# Patient Record
Sex: MALE | NOT HISPANIC OR LATINO | Employment: FULL TIME | URBAN - METROPOLITAN AREA
[De-identification: names, ages, dates, MRNs, and addresses within clinical notes are randomized per-mention and may not be internally consistent; named-entity substitution may affect disease eponyms.]

---

## 2023-03-17 ENCOUNTER — OFFICE VISIT (OUTPATIENT)
Dept: SURGERY | Facility: CLINIC | Age: 53
End: 2023-03-17
Payer: COMMERCIAL

## 2023-03-17 VITALS
WEIGHT: 275 LBS | SYSTOLIC BLOOD PRESSURE: 132 MMHG | DIASTOLIC BLOOD PRESSURE: 86 MMHG | HEIGHT: 70 IN | OXYGEN SATURATION: 95 % | RESPIRATION RATE: 16 BRPM | HEART RATE: 77 BPM | BODY MASS INDEX: 39.37 KG/M2

## 2023-03-17 DIAGNOSIS — K42.9 UMBILICAL HERNIA WITHOUT OBSTRUCTION AND WITHOUT GANGRENE: Primary | ICD-10-CM

## 2023-03-17 PROCEDURE — 99203 OFFICE O/P NEW LOW 30 MIN: CPT | Performed by: SURGERY

## 2023-03-17 RX ORDER — LISINOPRIL 20 MG/1
1 TABLET ORAL
COMMUNITY
Start: 2023-03-08 | End: 2023-11-02

## 2023-03-17 RX ORDER — TIRZEPATIDE 5 MG/.5ML
5 INJECTION, SOLUTION SUBCUTANEOUS WEEKLY
COMMUNITY
Start: 2023-02-24

## 2023-03-17 NOTE — PROGRESS NOTES
New Patient Office Visit      Assessment:    Cristian Allen is a 53 year old male with a Primary, reducible umbilical hernia.    Plan:    Cristian is enrolled in a weight loss program and has been making significant losses.  He has lost over 50lbs thus far. Encouraged continuation of this weight loss program, not only for his health but also for risk reduction for surgery.   We did discuss s/s of strangulated hernia and need for emergent operation.   We also discussed robotic  umbilical hernia repair in detail.   He will plan to call us when he has reached his target weight loss goal or the hernia becomes more symptomatic to schedule repair.     We have discussed observation, reduction techniques and importance, incarceration and strangulation signs, symptoms and importance as well as need to seek emergency treatment.  With watchful waiting likely 20-30% of patients will go on to have elective surgery and 3% will need emergency surgery for their ventral hernia.    We have discussed hernia repair with mesh in detail, including benefits, alternatives, complications, incision, scar, mesh, infection, anesthesia, bleeding, blood transfusion, DVT, PE, hernia recurrence, lifting and activity limits after surgery.  All questions have been answered to the best of my ability.      HPI:  Cristian Allen is a 53 year old male who presents for evaluation of a lump in the rashad-umbilical region.  He first noticed a lump for several years. He states more recently it has become bothersome for him. No signs/symptoms of strangulation. He also states he has started a weight loss program and has thus far been very successful.     Past Medical History:  No past medical history on file.    Past Surgical History:  No past surgical history on file.     Social History:  Social History     Tobacco Use     Smoking status: Never     Smokeless tobacco: Never   Substance Use Topics     Alcohol use: Not Currently     Drug use: Not Currently  "       Family History:  No family history on file.  No FH of bleeding or clotting disorders, or reactions to anesthesia    ROS:  The 10 point review of systems is negative other than noted in the HPI and/or below.    PE:    Vitals: /86   Pulse 77   Resp 16   Ht 1.778 m (5' 10\")   Wt 124.7 kg (275 lb)   SpO2 95%   BMI 39.46 kg/m    BMI= Body mass index is 39.46 kg/m .    General: Generally appears well.  Psych: Alert and Oriented.  Normal affect  Neurological: non-focal, moves extremities symmetrically, grossly normal strength and sensation  Eyes: Sclera clear  ENT: mucous membranes moist, external ears and nose normal  Respiratory: Breathing comfortably on room air  Cardiovascular:  Regular Rate and Rhythm  GI: Abdomen Non Distended Hernia:  umbilical, 5 cm, reducible, mildly tender  MSK: extremities without edema  Lymphatic/Hematologic/Immune:  No femoral lymphadenopathy.  Integumentary:  No rashes    Data reviewed    32 minutes spent on the date of the encounter doing chart review, history and exam, documentation and further activities as noted above      Jad Baum MD  03/17/23 1:54 PM     Please route or send letter to:  Primary Care Provider (PCP)  "

## 2023-11-02 ENCOUNTER — TELEPHONE (OUTPATIENT)
Dept: SURGERY | Facility: CLINIC | Age: 53
End: 2023-11-02

## 2023-11-02 ENCOUNTER — OFFICE VISIT (OUTPATIENT)
Dept: SURGERY | Facility: CLINIC | Age: 53
End: 2023-11-02
Payer: COMMERCIAL

## 2023-11-02 VITALS
DIASTOLIC BLOOD PRESSURE: 102 MMHG | WEIGHT: 210 LBS | OXYGEN SATURATION: 97 % | RESPIRATION RATE: 17 BRPM | HEIGHT: 70 IN | HEART RATE: 77 BPM | SYSTOLIC BLOOD PRESSURE: 172 MMHG | BODY MASS INDEX: 30.06 KG/M2

## 2023-11-02 DIAGNOSIS — K42.9 UMBILICAL HERNIA WITHOUT OBSTRUCTION AND WITHOUT GANGRENE: Primary | ICD-10-CM

## 2023-11-02 PROCEDURE — 99214 OFFICE O/P EST MOD 30 MIN: CPT | Performed by: SURGERY

## 2023-11-02 NOTE — TELEPHONE ENCOUNTER
Type of surgery: ROBOTIC ASSISTED UMBILICAL HERNIA REPAIR   Location of surgery: [facility]  Date and time of surgery: 12-6-23, 7:50 AM  Surgeon: DR CREWS  Pre-Op Appt Date: PATIENT TO SCHEDULE   Post-Op Appt Date: NA   Packet sent out:  GIVEN TO PATIENT   Pre-cert/Authorization completed:  Not Applicable  Date: 11-2-23      ROBOTIC ASSISTED UMBILICAL HERNIA REPAIR GENERAL PT INST TO HAVE H&P 90 MINS REQ PA ASSIST CJP ALW

## 2023-11-02 NOTE — LETTER
Showering Before Surgery      Your surgeon has asked you to take 2 showers before surgery.    Why is this important?  It is normal for bacteria (germs) to be on your skin. The skin protects us from these germs. When you have surgery, we cut the skin. Sometimes germs get into the cuts and cause infection (illness caused by germs). By following the instructions below and using special soap, you will lower the number of germs on your skin. This decreases your chance of infection.  Special soap  Buy or get 8 ounces of antiseptic surgical soap called 4% CHG. Common name brands of this soap are Hibiclens and Exidine.  You can find it at your local pharmacy, clinic or retail store. If you have trouble, ask your pharmacist to help you find the right substitute.  A note about shaving:  Do not shave within 12 inches of your incision (surgical cut) area for at least 3 days before surgery. Shaving can make small cuts in the skin. This puts you at a higher risk of infection.  Items you will need for each shower:  1 newly washed towel  4 ounces of one of the above soaps  Clean pajamas or clothes to change into    Follow these instructions:  Follow these steps the evening before surgery and the morning of surgery.  Wash your hair and body with your regular shampoo and soap. Make sure you rinse the shampoo and soap from your hair and body.  Using clean hands, apply about 2 ounces of soap gently on your skin from your ear lobes to your toes. Use on your groin area last. Do not use this soap on your face or head. If you get any soap in your eyes, ears or mouth, rinse right away.  Repeat step 2. It is very important to let the soap stay on your skin for at least 1 minute.    Rinse well and dry off using a clean towel.    If you feel any tingling, itching or other irritation, rinse right away. It is normal to feel some coolness on the skin after using the antiseptic soap. Your skin may feel a bit dry after the shower, but do not use  any lotions, creams or moisturizers. Do not use hair spray or other products in your hair.  5.  Dress in freshly washed clothes or pajamas. Use fresh pillowcases and sheets on your bed.    Repeat these steps the morning of surgery.  If you have any questions about showering or an allergy to CHG soap, please call your surgery center.    For informational purposes only. Not to replace the advice of your health care provider. Copyright   2012 [facility]. All rights reserved. Clinically reviewed by [employer]. Doculogy 109319 - REV 12

## 2023-11-02 NOTE — LETTER
2023       Cristian Allen    RE: [id number]  : 1970    Cristian Allen has been scheduled for surgery on 23 at 7:50 AM at [facility] with Dr Jad Baum.  The hospital is located at [address] in [place].    Please check in at the Surgery reception desk at 5:50 AM. This is located in the back of the hospital on the East side, just past the Emergency Room entrance.     DO NOT EAT OR DRINK ANYTHING 8 HOURS BEFORE YOUR ARRIVAL TIME.   You may have sips of clear liquids up until 2 hours before your arrival time. If you have been advised to take your medication, please do this early in the morning with just sips of clear liquid.     Hospital regulations require an updated pre-operative examination to be completed within 30 days of the procedure. This can be done by your primary care provider. Please ask them to fax documentation to [phone]. We also recommend you bring a copy with you.     You should shower before your surgery with Hibiclens or Exidine soap.  This can be found at your local pharmacy or you can pick it up from our office for free.  Please call our office if you have any questions.     You will be required to have an Adult (friend or family member) drive you home after your surgery and arrange for an adult to stay with you until the next morning.     You will receive several calls from our staff 3-7 days prior to your scheduled procedure with further details and to answer any questions you may have.    It is sometimes necessary to adjust the surgery schedule due to emergencies and additions to the schedule.  If your surgery is affected by this, we greatly appreciate your flexibility and understanding in this matter    It is best if you call regarding post-operative questions between the hours of 8:00 am & 3:00 pm Monday-Friday, so you have access to the daytime care team that know you best.  Prescription refills are accepted during regular office  hours only.    Please do not bring any Disability or FMLA papers to the hospital.  They need to be either faxed ([phone]), mailed or hand delivered to our office by you or a family member for completion.  Please allow 14 business days to complete paperwork.        If you have questions or concerns, please contact our office at [phone].

## 2023-11-02 NOTE — LETTER
"November 2, 2023      RE:   Cristian Allen 1970      Dear Colleague,    Thank you for referring your patient, Cristian Allen, to [facility] at [facility]. Please see a copy of my visit note below.    Cristian to follow up with Primary Care provider regarding elevated blood pressure.     New Patient Office Visit      Assessment:    Cristian Allen is a 53 year old male with a Primary, partially reducible umbilical hernia.    Plan:    laparoscopic-assisted  umbilical hernia repair.   We will schedule surgery at the patient's convenience.   Will need preop H&P by his PCP (Mirian Capone NP)      We have discussed observation, reduction techniques and importance, incarceration and strangulation signs, symptoms and importance as well as need to seek emergency treatment.  With watchful waiting likely 20-30% of patients will go on to have elective surgery and 5% will need emergency surgery for their ventral hernia.    We have discussed hernia repair with mesh in detail, including benefits, alternatives, complications, incision, scar, mesh, infection, anesthesia, bleeding, blood transfusion, DVT, PE, hernia recurrence, lifting and activity limits after surgery.  All questions have been answered to the best of my ability.    He has been given literature to review.     Recommended time off work postop:  1 wks  HPI:  Cristian Allen is a 53 year old male who presents for evaluation of a lump in the rashad-umbilical region.  He does have pain and states it feels dull, aching, and pressure. He describes exacerbating factors including bending, prolonged standing, lifting, and flexing.        Family History:  No family history on file.  No FH of bleeding or clotting disorders, or reactions to anesthesia    ROS:  The 10 point review of systems is negative other than noted in the HPI and/or below.    PE:    Vitals: BP (!) 172/102   Pulse 77   Resp 17   Ht 1.778 m (5' 10\")   Wt 95.3 kg (210 lb)  "  SpO2 97%   BMI 30.13 kg/m    BMI= Body mass index is 30.13 kg/m .    General: Generally appears well.  Psych: Alert and Oriented.  Normal affect  Neurological: non-focal, moves extremities symmetrically, grossly normal strength and sensation  Eyes: Sclera clear  ENT: mucous membranes moist, external ears and nose normal  Respiratory:  Lungs clear to ausculation bilaterally with good air excursion Breathing comfortably on room air  Cardiovascular:  Regular Rate and Rhythm with no murmurs gallops or rubs, normal peripheral pulses  GI: Abdomen Distended Hernia:  umbilical, mild tenderness with palpation  MSK: extremities without edema  Lymphatic/Hematologic/Immune:  No femoral lymphadenopathy.  Integumentary:  No rashes    Again, thank you for allowing me to participate in the care of your patient.      Sincerely,      Jad Baum MD

## 2023-11-28 RX ORDER — HYDROCHLOROTHIAZIDE 25 MG/1
25 TABLET ORAL DAILY
COMMUNITY

## 2023-11-28 RX ORDER — LISINOPRIL 40 MG/1
40 TABLET ORAL DAILY
COMMUNITY

## 2023-11-28 RX ORDER — CHLORAL HYDRATE 500 MG
2 CAPSULE ORAL DAILY
COMMUNITY

## 2023-11-28 RX ORDER — MULTIVITAMIN WITH IRON
1 TABLET ORAL DAILY
COMMUNITY

## 2023-12-06 ENCOUNTER — HOSPITAL ENCOUNTER (OUTPATIENT)
Facility: CLINIC | Age: 53
Discharge: HOME | End: 2023-12-06
Attending: SURGERY | Admitting: SURGERY
Payer: COMMERCIAL

## 2023-12-06 ENCOUNTER — ANESTHESIA (OUTPATIENT)
Dept: SURGERY | Facility: CLINIC | Age: 53
End: 2023-12-06
Payer: COMMERCIAL

## 2023-12-06 ENCOUNTER — ANESTHESIA EVENT (OUTPATIENT)
Dept: SURGERY | Facility: CLINIC | Age: 53
End: 2023-12-06
Payer: COMMERCIAL

## 2023-12-06 ENCOUNTER — APPOINTMENT (OUTPATIENT)
Dept: SURGERY | Facility: PHYSICIAN GROUP | Age: 53
End: 2023-12-06
Payer: COMMERCIAL

## 2023-12-06 VITALS
HEART RATE: 62 BPM | RESPIRATION RATE: 16 BRPM | DIASTOLIC BLOOD PRESSURE: 65 MMHG | WEIGHT: 216.1 LBS | SYSTOLIC BLOOD PRESSURE: 101 MMHG | TEMPERATURE: 96.8 F | BODY MASS INDEX: 31.01 KG/M2 | OXYGEN SATURATION: 96 %

## 2023-12-06 DIAGNOSIS — K42.9 UMBILICAL HERNIA WITHOUT OBSTRUCTION AND WITHOUT GANGRENE: Primary | ICD-10-CM

## 2023-12-06 LAB — GLUCOSE BLDC GLUCOMTR-MCNC: 98 MG/DL (ref 70–99)

## 2023-12-06 PROCEDURE — 258N000003 HC RX IP 258 OP 636: Performed by: NURSE ANESTHETIST, CERTIFIED REGISTERED

## 2023-12-06 PROCEDURE — 82962 GLUCOSE BLOOD TEST: CPT

## 2023-12-06 PROCEDURE — 250N000011 HC RX IP 250 OP 636: Mod: JZ | Performed by: ANESTHESIOLOGY

## 2023-12-06 PROCEDURE — 250N000011 HC RX IP 250 OP 636: Performed by: NURSE ANESTHETIST, CERTIFIED REGISTERED

## 2023-12-06 PROCEDURE — C1781 MESH (IMPLANTABLE): HCPCS | Performed by: SURGERY

## 2023-12-06 PROCEDURE — 250N000011 HC RX IP 250 OP 636: Performed by: SURGERY

## 2023-12-06 PROCEDURE — 999N000141 HC STATISTIC PRE-PROCEDURE NURSING ASSESSMENT: Performed by: SURGERY

## 2023-12-06 PROCEDURE — 710N000009 HC RECOVERY PHASE 1, LEVEL 1, PER MIN: Performed by: SURGERY

## 2023-12-06 PROCEDURE — 370N000017 HC ANESTHESIA TECHNICAL FEE, PER MIN: Performed by: SURGERY

## 2023-12-06 PROCEDURE — 250N000013 HC RX MED GY IP 250 OP 250 PS 637: Performed by: SURGERY

## 2023-12-06 PROCEDURE — 250N000025 HC SEVOFLURANE, PER MIN: Performed by: SURGERY

## 2023-12-06 PROCEDURE — 49594 RPR AA HRN 1ST 3-10 NCR/STRN: CPT | Performed by: SURGERY

## 2023-12-06 PROCEDURE — 250N000009 HC RX 250: Performed by: NURSE ANESTHETIST, CERTIFIED REGISTERED

## 2023-12-06 PROCEDURE — 272N000001 HC OR GENERAL SUPPLY STERILE: Performed by: SURGERY

## 2023-12-06 PROCEDURE — 710N000012 HC RECOVERY PHASE 2, PER MINUTE: Performed by: SURGERY

## 2023-12-06 PROCEDURE — 360N000080 HC SURGERY LEVEL 7, PER MIN: Performed by: SURGERY

## 2023-12-06 DEVICE — MESH SURGICAL INGUINAL HRN REP MACROPOR 15CMX15CM  PPM1515X3: Type: IMPLANTABLE DEVICE | Site: ABDOMEN | Status: FUNCTIONAL

## 2023-12-06 RX ORDER — ONDANSETRON 4 MG/1
4 TABLET, FILM COATED ORAL EVERY 8 HOURS PRN
Qty: 12 TABLET | Refills: 0 | Status: SHIPPED | OUTPATIENT
Start: 2023-12-06

## 2023-12-06 RX ORDER — CEFAZOLIN SODIUM/WATER 2 G/20 ML
2 SYRINGE (ML) INTRAVENOUS
Status: DISCONTINUED | OUTPATIENT
Start: 2023-12-06 | End: 2023-12-06 | Stop reason: HOSPADM

## 2023-12-06 RX ORDER — DEXAMETHASONE SODIUM PHOSPHATE 4 MG/ML
4 INJECTION, SOLUTION INTRA-ARTICULAR; INTRALESIONAL; INTRAMUSCULAR; INTRAVENOUS; SOFT TISSUE
Status: DISCONTINUED | OUTPATIENT
Start: 2023-12-06 | End: 2023-12-06 | Stop reason: HOSPADM

## 2023-12-06 RX ORDER — FENTANYL CITRATE 50 UG/ML
50 INJECTION, SOLUTION INTRAMUSCULAR; INTRAVENOUS EVERY 5 MIN PRN
Status: DISCONTINUED | OUTPATIENT
Start: 2023-12-06 | End: 2023-12-06 | Stop reason: HOSPADM

## 2023-12-06 RX ORDER — DIAZEPAM 10 MG/2ML
2.5 INJECTION, SOLUTION INTRAMUSCULAR; INTRAVENOUS
Status: DISCONTINUED | OUTPATIENT
Start: 2023-12-06 | End: 2023-12-06 | Stop reason: HOSPADM

## 2023-12-06 RX ORDER — ONDANSETRON 4 MG/1
4 TABLET, ORALLY DISINTEGRATING ORAL EVERY 30 MIN PRN
Status: DISCONTINUED | OUTPATIENT
Start: 2023-12-06 | End: 2023-12-06 | Stop reason: HOSPADM

## 2023-12-06 RX ORDER — LIDOCAINE 40 MG/G
CREAM TOPICAL
Status: DISCONTINUED | OUTPATIENT
Start: 2023-12-06 | End: 2023-12-06 | Stop reason: HOSPADM

## 2023-12-06 RX ORDER — HYDROMORPHONE HCL IN WATER/PF 6 MG/30 ML
0.4 PATIENT CONTROLLED ANALGESIA SYRINGE INTRAVENOUS EVERY 5 MIN PRN
Status: DISCONTINUED | OUTPATIENT
Start: 2023-12-06 | End: 2023-12-06 | Stop reason: HOSPADM

## 2023-12-06 RX ORDER — BUPIVACAINE HYDROCHLORIDE 5 MG/ML
INJECTION, SOLUTION PERINEURAL PRN
Status: DISCONTINUED | OUTPATIENT
Start: 2023-12-06 | End: 2023-12-06 | Stop reason: HOSPADM

## 2023-12-06 RX ORDER — FENTANYL CITRATE 50 UG/ML
25 INJECTION, SOLUTION INTRAMUSCULAR; INTRAVENOUS EVERY 5 MIN PRN
Status: DISCONTINUED | OUTPATIENT
Start: 2023-12-06 | End: 2023-12-06 | Stop reason: HOSPADM

## 2023-12-06 RX ORDER — EPHEDRINE SULFATE 50 MG/ML
INJECTION, SOLUTION INTRAMUSCULAR; INTRAVENOUS; SUBCUTANEOUS PRN
Status: DISCONTINUED | OUTPATIENT
Start: 2023-12-06 | End: 2023-12-06

## 2023-12-06 RX ORDER — OXYCODONE HYDROCHLORIDE 5 MG/1
5 TABLET ORAL EVERY 4 HOURS PRN
Status: DISCONTINUED | OUTPATIENT
Start: 2023-12-06 | End: 2023-12-06 | Stop reason: HOSPADM

## 2023-12-06 RX ORDER — SODIUM CHLORIDE, SODIUM LACTATE, POTASSIUM CHLORIDE, CALCIUM CHLORIDE 600; 310; 30; 20 MG/100ML; MG/100ML; MG/100ML; MG/100ML
INJECTION, SOLUTION INTRAVENOUS CONTINUOUS
Status: DISCONTINUED | OUTPATIENT
Start: 2023-12-06 | End: 2023-12-06 | Stop reason: HOSPADM

## 2023-12-06 RX ORDER — LABETALOL HYDROCHLORIDE 5 MG/ML
10 INJECTION, SOLUTION INTRAVENOUS EVERY 10 MIN PRN
Status: DISCONTINUED | OUTPATIENT
Start: 2023-12-06 | End: 2023-12-06 | Stop reason: HOSPADM

## 2023-12-06 RX ORDER — PROPOFOL 10 MG/ML
INJECTION, EMULSION INTRAVENOUS PRN
Status: DISCONTINUED | OUTPATIENT
Start: 2023-12-06 | End: 2023-12-06

## 2023-12-06 RX ORDER — MEPERIDINE HYDROCHLORIDE 25 MG/ML
12.5 INJECTION INTRAMUSCULAR; INTRAVENOUS; SUBCUTANEOUS EVERY 5 MIN PRN
Status: DISCONTINUED | OUTPATIENT
Start: 2023-12-06 | End: 2023-12-06 | Stop reason: HOSPADM

## 2023-12-06 RX ORDER — DEXAMETHASONE SODIUM PHOSPHATE 4 MG/ML
INJECTION, SOLUTION INTRA-ARTICULAR; INTRALESIONAL; INTRAMUSCULAR; INTRAVENOUS; SOFT TISSUE PRN
Status: DISCONTINUED | OUTPATIENT
Start: 2023-12-06 | End: 2023-12-06

## 2023-12-06 RX ORDER — CEFAZOLIN SODIUM/WATER 2 G/20 ML
2 SYRINGE (ML) INTRAVENOUS SEE ADMIN INSTRUCTIONS
Status: DISCONTINUED | OUTPATIENT
Start: 2023-12-06 | End: 2023-12-06 | Stop reason: HOSPADM

## 2023-12-06 RX ORDER — FENTANYL CITRATE 50 UG/ML
25 INJECTION, SOLUTION INTRAMUSCULAR; INTRAVENOUS
Status: DISCONTINUED | OUTPATIENT
Start: 2023-12-06 | End: 2023-12-06 | Stop reason: HOSPADM

## 2023-12-06 RX ORDER — ONDANSETRON 2 MG/ML
4 INJECTION INTRAMUSCULAR; INTRAVENOUS EVERY 30 MIN PRN
Status: DISCONTINUED | OUTPATIENT
Start: 2023-12-06 | End: 2023-12-06 | Stop reason: HOSPADM

## 2023-12-06 RX ORDER — ONDANSETRON 2 MG/ML
INJECTION INTRAMUSCULAR; INTRAVENOUS PRN
Status: DISCONTINUED | OUTPATIENT
Start: 2023-12-06 | End: 2023-12-06

## 2023-12-06 RX ORDER — AMOXICILLIN 250 MG
1-2 CAPSULE ORAL 2 TIMES DAILY
Qty: 30 TABLET | Refills: 0 | Status: SHIPPED | OUTPATIENT
Start: 2023-12-06

## 2023-12-06 RX ORDER — OXYCODONE HYDROCHLORIDE 5 MG/1
5-10 TABLET ORAL EVERY 4 HOURS PRN
Qty: 6 TABLET | Refills: 0 | Status: SHIPPED | OUTPATIENT
Start: 2023-12-06

## 2023-12-06 RX ORDER — OXYCODONE HYDROCHLORIDE 5 MG/1
10 TABLET ORAL EVERY 4 HOURS PRN
Status: DISCONTINUED | OUTPATIENT
Start: 2023-12-06 | End: 2023-12-06 | Stop reason: HOSPADM

## 2023-12-06 RX ORDER — FENTANYL CITRATE 50 UG/ML
INJECTION, SOLUTION INTRAMUSCULAR; INTRAVENOUS PRN
Status: DISCONTINUED | OUTPATIENT
Start: 2023-12-06 | End: 2023-12-06

## 2023-12-06 RX ORDER — SODIUM CHLORIDE, SODIUM LACTATE, POTASSIUM CHLORIDE, CALCIUM CHLORIDE 600; 310; 30; 20 MG/100ML; MG/100ML; MG/100ML; MG/100ML
INJECTION, SOLUTION INTRAVENOUS CONTINUOUS PRN
Status: DISCONTINUED | OUTPATIENT
Start: 2023-12-06 | End: 2023-12-06

## 2023-12-06 RX ORDER — LIDOCAINE HYDROCHLORIDE 20 MG/ML
INJECTION, SOLUTION INFILTRATION; PERINEURAL PRN
Status: DISCONTINUED | OUTPATIENT
Start: 2023-12-06 | End: 2023-12-06

## 2023-12-06 RX ORDER — HYDROMORPHONE HCL IN WATER/PF 6 MG/30 ML
0.2 PATIENT CONTROLLED ANALGESIA SYRINGE INTRAVENOUS EVERY 5 MIN PRN
Status: DISCONTINUED | OUTPATIENT
Start: 2023-12-06 | End: 2023-12-06 | Stop reason: HOSPADM

## 2023-12-06 RX ORDER — ALBUTEROL SULFATE 0.83 MG/ML
2.5 SOLUTION RESPIRATORY (INHALATION) EVERY 4 HOURS PRN
Status: DISCONTINUED | OUTPATIENT
Start: 2023-12-06 | End: 2023-12-06 | Stop reason: HOSPADM

## 2023-12-06 RX ORDER — OXYCODONE HYDROCHLORIDE 5 MG/1
5 TABLET ORAL
Status: COMPLETED | OUTPATIENT
Start: 2023-12-06 | End: 2023-12-06

## 2023-12-06 RX ADMIN — SUGAMMADEX 150 MG: 100 INJECTION, SOLUTION INTRAVENOUS at 09:43

## 2023-12-06 RX ADMIN — PHENYLEPHRINE HYDROCHLORIDE 200 MCG: 10 INJECTION INTRAVENOUS at 08:48

## 2023-12-06 RX ADMIN — DEXAMETHASONE SODIUM PHOSPHATE 8 MG: 4 INJECTION, SOLUTION INTRA-ARTICULAR; INTRALESIONAL; INTRAMUSCULAR; INTRAVENOUS; SOFT TISSUE at 08:04

## 2023-12-06 RX ADMIN — Medication 2 G: at 08:02

## 2023-12-06 RX ADMIN — FENTANYL CITRATE 50 MCG: 50 INJECTION INTRAMUSCULAR; INTRAVENOUS at 08:07

## 2023-12-06 RX ADMIN — ONDANSETRON 4 MG: 2 INJECTION INTRAMUSCULAR; INTRAVENOUS at 11:52

## 2023-12-06 RX ADMIN — PHENYLEPHRINE HYDROCHLORIDE 200 MCG: 10 INJECTION INTRAVENOUS at 08:56

## 2023-12-06 RX ADMIN — LIDOCAINE HYDROCHLORIDE 50 MG: 20 INJECTION, SOLUTION INFILTRATION; PERINEURAL at 08:03

## 2023-12-06 RX ADMIN — ROCURONIUM BROMIDE 10 MG: 50 INJECTION, SOLUTION INTRAVENOUS at 08:48

## 2023-12-06 RX ADMIN — FENTANYL CITRATE 50 MCG: 50 INJECTION INTRAMUSCULAR; INTRAVENOUS at 08:03

## 2023-12-06 RX ADMIN — OXYCODONE HYDROCHLORIDE 5 MG: 5 TABLET ORAL at 10:56

## 2023-12-06 RX ADMIN — Medication 10 MG: at 09:03

## 2023-12-06 RX ADMIN — ROCURONIUM BROMIDE 50 MG: 50 INJECTION, SOLUTION INTRAVENOUS at 08:04

## 2023-12-06 RX ADMIN — PHENYLEPHRINE HYDROCHLORIDE 200 MCG: 10 INJECTION INTRAVENOUS at 08:30

## 2023-12-06 RX ADMIN — SODIUM CHLORIDE, POTASSIUM CHLORIDE, SODIUM LACTATE AND CALCIUM CHLORIDE: 600; 310; 30; 20 INJECTION, SOLUTION INTRAVENOUS at 07:34

## 2023-12-06 RX ADMIN — FENTANYL CITRATE 50 MCG: 50 INJECTION, SOLUTION INTRAMUSCULAR; INTRAVENOUS at 10:17

## 2023-12-06 RX ADMIN — ONDANSETRON 4 MG: 2 INJECTION INTRAMUSCULAR; INTRAVENOUS at 09:42

## 2023-12-06 RX ADMIN — SODIUM CHLORIDE, POTASSIUM CHLORIDE, SODIUM LACTATE AND CALCIUM CHLORIDE: 600; 310; 30; 20 INJECTION, SOLUTION INTRAVENOUS at 08:43

## 2023-12-06 RX ADMIN — PHENYLEPHRINE HYDROCHLORIDE 200 MCG: 10 INJECTION INTRAVENOUS at 08:14

## 2023-12-06 RX ADMIN — HYDROMORPHONE HYDROCHLORIDE 1 MG: 1 INJECTION, SOLUTION INTRAMUSCULAR; INTRAVENOUS; SUBCUTANEOUS at 08:47

## 2023-12-06 RX ADMIN — PHENYLEPHRINE HYDROCHLORIDE 200 MCG: 10 INJECTION INTRAVENOUS at 08:19

## 2023-12-06 RX ADMIN — PROPOFOL 200 MG: 10 INJECTION, EMULSION INTRAVENOUS at 08:03

## 2023-12-06 RX ADMIN — FENTANYL CITRATE 50 MCG: 50 INJECTION, SOLUTION INTRAMUSCULAR; INTRAVENOUS at 10:07

## 2023-12-06 RX ADMIN — HYDROMORPHONE HYDROCHLORIDE 0.2 MG: 0.2 INJECTION, SOLUTION INTRAMUSCULAR; INTRAVENOUS; SUBCUTANEOUS at 10:23

## 2023-12-06 RX ADMIN — MIDAZOLAM 2 MG: 1 INJECTION INTRAMUSCULAR; INTRAVENOUS at 07:59

## 2023-12-06 ASSESSMENT — ACTIVITIES OF DAILY LIVING (ADL)
ADLS_ACUITY_SCORE: 35

## 2023-12-06 NOTE — ANESTHESIA CARE TRANSFER NOTE
Patient: Cristian Allen    Procedure: Procedure(s):  HERNIORRHAPHY, UMBILICAL, ROBOT-ASSISTED       Diagnosis: Umbilical hernia without obstruction and without gangrene [K42.9]  Diagnosis Additional Information: No value filed.    Anesthesia Type:   General     Note:      Level of Consciousness: awake  Oxygen Supplementation: face mask    Independent Airway: airway patency satisfactory and stable  Dentition: dentition unchanged  Vital Signs Stable: post-procedure vital signs reviewed and stable  Report to RN Given: handoff report given  Patient transferred to: PACU    Handoff Report: Identifed the Patient, Identified the Reponsible Provider, Reviewed the pertinent medical history, Discussed the surgical course, Reviewed Intra-OP anesthesia mangement and issues during anesthesia, Set expectations for post-procedure period and Allowed opportunity for questions and acknowledgement of understanding      Vitals:  Vitals Value Taken Time   /78 12/06/23 0954   Temp     Pulse 68 12/06/23 0955   Resp 17 12/06/23 0955   SpO2 100 % 12/06/23 0955   Vitals shown include unfiled device data.    Electronically Signed By: SIMBA Perez CRNA  December 6, 2023  9:56 AM

## 2023-12-06 NOTE — ANESTHESIA PREPROCEDURE EVALUATION
"Anesthesia Pre-Procedure Evaluation    Patient: Cristian Allen   MRN: [id number] : 1970        Procedure : Procedure(s):  HERNIORRHAPHY, UMBILICAL, ROBOT-ASSISTED          Past Medical History:   Diagnosis Date    History of blood transfusion     after orthopedic surgery    Hypertension     Sleep apnea     uses cpap      Past Surgical History:   Procedure Laterality Date    ORTHOPEDIC SURGERY Left     ORIF femur      No Known Allergies   Social History     Tobacco Use    Smoking status: Never    Smokeless tobacco: Never   Substance Use Topics    Alcohol use: Not Currently      Wt Readings from Last 1 Encounters:   23 98 kg (216 lb 1.6 oz)        Anesthesia Evaluation   Pt has had prior anesthetic. Type: General.    No history of anesthetic complications       ROS/MED HX  ENT/Pulmonary:     (+) sleep apnea, uses CPAP,                                     Neurologic:  - neg neurologic ROS     Cardiovascular:     (+)  hypertension- -   -  - -                                      METS/Exercise Tolerance:     Hematologic:  - neg hematologic  ROS     Musculoskeletal:  - neg musculoskeletal ROS     GI/Hepatic: Comment: Umbilical hernia      Renal/Genitourinary:  - neg Renal ROS     Endo:  - neg endo ROS     Psychiatric/Substance Use:  - neg psychiatric ROS     Infectious Disease:  - neg infectious disease ROS     Malignancy:  - neg malignancy ROS     Other:  - neg other ROS          Physical Exam    Airway        Mallampati: III   TM distance: > 3 FB   Neck ROM: full   Mouth opening: > 3 cm    Respiratory Devices and Support         Dental  no notable dental history   Comment: Chipped #25        Cardiovascular   cardiovascular exam normal          Pulmonary   pulmonary exam normal                OUTSIDE LABS:  CBC: No results found for: \"WBC\", \"HGB\", \"HCT\", \"PLT\"  BMP: No results found for: \"NA\", \"POTASSIUM\", \"CHLORIDE\", \"CO2\", \"BUN\", \"CR\", \"GLC\"  COAGS: No results found for: \"PTT\", \"INR\", \"FIBR\"  POC: No " "results found for: \"BGM\", \"HCG\", \"HCGS\"  HEPATIC: No results found for: \"ALBUMIN\", \"PROTTOTAL\", \"ALT\", \"AST\", \"GGT\", \"ALKPHOS\", \"BILITOTAL\", \"BILIDIRECT\", \"CHRISTIANO\"  OTHER: No results found for: \"PH\", \"LACT\", \"A1C\", \"MARICEL\", \"PHOS\", \"MAG\", \"LIPASE\", \"AMYLASE\", \"TSH\", \"T4\", \"T3\", \"CRP\", \"SED\"    Anesthesia Plan    ASA Status:  3    NPO Status:  NPO Appropriate    Anesthesia Type: General.     - Airway: ETT   Induction: Intravenous, Propofol.   Maintenance: Balanced.        Consents    Anesthesia Plan(s) and associated risks, benefits, and realistic alternatives discussed. Questions answered and patient/representative(s) expressed understanding.     - Discussed:     - Discussed with:  Patient            Postoperative Care    Pain management: IV analgesics, Oral pain medications, Multi-modal analgesia.   PONV prophylaxis: Ondansetron (or other 5HT-3), Dexamethasone or Solumedrol     Comments:               Robert Russo MD    I have reviewed the pertinent notes and labs in the chart from the past 30 days and (re)examined the patient.  Any updates or changes from those notes are reflected in this note.                  "

## 2023-12-06 NOTE — OP NOTE
[facility] General Surgery Operative Note    Pre-operative diagnosis: Incarcerated umbilical hernia   Post-operative diagnosis: same   Procedure: Robotic assisted laparoscopic KEANN Umbilical hernia repair with mesh   Surgeon: Jad Baum MD     Assistant(s): Emilee Jackson PA-C  The Physician Assistant was medically necessary for their expertise in prepping, camera management, exchanging robotic instruments, passing suture and mesh through the robotic ports, and suturing   Anesthesia: general   Estimated blood loss:  Specimen:  FINDINGS:  5 cc  none  3.5cmx2.5cm incarcerated umbilical hernia       Indication for Procedure: This is a 53 year old male who presented to the office with a symptomatic hernia and desired repair. We discussed approaches to management and mutually agreed on a robotic approach for this patient.    Description of procedure:  Patient was brought to the operating room, placed on the operating table in supine position. Anesthesia was induced. His  pressure points were padded and he was secured with a safety strap. A timeout was called to verify the patient, site of procedure and procedure to be performed.    The abdomen was entered with a Veress needle at Nuñez's point. Pneumoperitoneum was established. The Veress was removed and an 8mm robotic trocar was placed into the abdomen. The abdomen was surveyed and found to have no obvious injuries from needle or trocar placement.  Two additional 8mm ports were then placed in the left abdomen, after marking out the dimensions of the hernia. Care was taken to make sure ports were adequately spaced from bony structures to allow for movement.  The robot was then docked. With a monopolar scissors in the right hand and force bipolar in the left hand, the contents of the hernia sac (omentum) were reduced into the abdomen. Bleeding was controlled with cautery. Next a score klaus was made in the peritoneum approximately 6 cm superior to the hernia  defect. The peritoneum was then sharp and bluntly dissected off of the anterior abdominal wall, creating a space large enough to accomodate our mesh. The perotoneum was able to be completely reduced out of the hernia.   There was a single midline hernia defect. A running #1 Strattafix was then used to close the fascial defect(s). The bipolar grasper was used to measure the space and a [id number] parietene mesh was then cut and placed into the abdomen. This laid flat against the abdominal wall. We secured the mesh in 6 quadrants with 2-0 vicryl suture.  Next the peritoneal defect as closed with a running 3-0 stratafix.  The abdominal cavity was inspected and there was adequate hemostasis. The trocars were then removed. The skin was closed with 4-0 suture. Sterile dressings were applied with a pressure dressing placed at the umbilicus to aid against seroma formation. At the end of the operation, all sponge, instrument, and needle counts were correct.     Jad Baum MD

## 2023-12-06 NOTE — PROGRESS NOTES
Gen Surg  12/6/2023    52 yo male here today for umbilical hernia repair via robot  Discussed risks/benefits/alternatives to the procedure.  All questions answered.    Gen: NAD  Lungs: CTAB  Heart: RRR  Abd: soft, non-tender, umbilical hernia present.    A/P:  52 yo male with umbilical hernia  -proceed with laparoscopic/robotic umbilical hernia repair.

## 2023-12-06 NOTE — ANESTHESIA POSTPROCEDURE EVALUATION
Patient: Cristian Allen    Procedure: Procedure(s):  HERNIORRHAPHY, UMBILICAL, ROBOT-ASSISTED       Anesthesia Type:  General    Note:     Postop Pain Control: Uneventful            Sign Out: Well controlled pain   PONV: No   Neuro/Psych: Uneventful            Sign Out: Acceptable/Baseline neuro status   Airway/Respiratory: Uneventful            Sign Out: Acceptable/Baseline resp. status   CV/Hemodynamics: Uneventful            Sign Out: Acceptable CV status   Other NRE: NONE   DID A NON-ROUTINE EVENT OCCUR? No           Last vitals:  Vitals Value Taken Time   /71 12/06/23 1010   Temp 97.6  F (36.4  C) 12/06/23 0954   Pulse 59 12/06/23 1015   Resp 16 12/06/23 1015   SpO2 100 % 12/06/23 1015   Vitals shown include unfiled device data.    Electronically Signed By: Robert Russo MD  December 6, 2023  10:16 AM

## 2023-12-06 NOTE — ANESTHESIA PROCEDURE NOTES
Airway       Patient location during procedure: OR       Procedure Start/Stop Times: 12/6/2023 8:06 AM  Staff -        Performed By: CRNA  Consent for Airway        Urgency: elective  Indications and Patient Condition       Indications for airway management: rashad-procedural and airway protection       Induction type:intravenous       Mask difficulty assessment: 1 - vent by mask    Final Airway Details       Final airway type: endotracheal airway       Successful airway: ETT - single  Endotracheal Airway Details        ETT size (mm): 7.0       Cuffed: yes       Successful intubation technique: direct laryngoscopy       DL Blade Type: [place] 2       Grade View of Cords: 1       Adjucts: stylet       Position: Right       Measured from: gums/teeth       Secured at (cm): 22       Bite block used: None    Post intubation assessment        Placement verified by: capnometry and equal breath sounds        Number of attempts at approach: 1       Secured with: tape       Ease of procedure: easy       Dentition: Intact    Medication(s) Administered   Medication Administration Time: 12/6/2023 8:06 AM

## 2023-12-12 ENCOUNTER — TELEPHONE (OUTPATIENT)
Dept: SURGERY | Facility: CLINIC | Age: 53
End: 2023-12-12
Payer: COMMERCIAL

## 2023-12-12 NOTE — TELEPHONE ENCOUNTER
Name of caller: Patient    Reason for Call:  Pt has questions regarding activity.    Surgeon:  Dr. Baum    Recent Surgery:  Yes.    If yes, when & what type:  12/6, Robotic assisted laparoscopic KENAN Umbilical hernia repair with mesh       Best phone number to reach pt at is: [phone]  Ok to leave a message with medical info? Yes.    Pharmacy preferred (if calling for a refill): NA

## 2023-12-12 NOTE — TELEPHONE ENCOUNTER
Procedure: Robotic assisted laparoscopic KENAN Umbilical hernia repair with mesh    Date: 12/06/2023    Surgeon: Bautista    Patient calling to discuss recovery/returning to activities    He is wondering about returning to sexual activity as well as returning to pilates, core work, ect.    Informed him that per his discharge instructions he can return to normal activity as tolerated.  Informed him that at this point he can gradually increase his activity.  Soreness expected. But, if he feels pain he should stop.  Advised him to lay off pilates/core activity until 2-3 weeks post op, or until he receives PA follow up call.  He agreed.    When can he submerge incisions? Informed him that, per his discharge instructions, there are no present restrictions on bathing/swimming.  Informed him that, generally, the recommendation is to wait for two weeks after surgery prior to submerging incisions.     All questions answered.  He will call JACOB Eldridge RN-BSN

## 2023-12-18 NOTE — PROGRESS NOTES
New Patient Office Visit      Assessment:    Cristian Allen is a 53 year old male with a Primary, partially reducible umbilical hernia.    Plan:    laparoscopic-assisted  umbilical hernia repair.   We will schedule surgery at the patient's convenience.   Will need preop H&P by his PCP (Mirian Capone NP)      We have discussed observation, reduction techniques and importance, incarceration and strangulation signs, symptoms and importance as well as need to seek emergency treatment.  With watchful waiting likely 20-30% of patients will go on to have elective surgery and 5% will need emergency surgery for their ventral hernia.    We have discussed hernia repair with mesh in detail, including benefits, alternatives, complications, incision, scar, mesh, infection, anesthesia, bleeding, blood transfusion, DVT, PE, hernia recurrence, lifting and activity limits after surgery.  All questions have been answered to the best of my ability.    He has been given literature to review.     Recommended time off work postop:  1 wks  HPI:  Cristian Allen is a 53 year old male who presents for evaluation of a lump in the rashad-umbilical region.  He does have pain and states it feels dull, aching, and pressure. He describes exacerbating factors including bending, prolonged standing, lifting, and flexing.       Past Medical History:  Past Medical History:   Diagnosis Date    History of blood transfusion     after orthopedic surgery    Hypertension     Sleep apnea     uses cpap       Past Surgical History:  Past Surgical History:   Procedure Laterality Date    DAVINCI HERNIORRHAPHY UMBILICAL N/A 12/6/2023    Procedure: HERNIORRHAPHY, UMBILICAL, ROBOT-ASSISTED;  Surgeon: Jad Baum MD;  Location: [facility] OR    ORTHOPEDIC SURGERY Left 1985    ORIF femur        Social History:  Social History     Tobacco Use    Smoking status: Never    Smokeless tobacco: Never   Substance Use Topics    Alcohol use: Not Currently    Drug use:  "Not Currently          Family History:  No family history on file.  No FH of bleeding or clotting disorders, or reactions to anesthesia    ROS:  The 10 point review of systems is negative other than noted in the HPI and/or below.    PE:    Vitals: BP (!) 172/102   Pulse 77   Resp 17   Ht 1.778 m (5' 10\")   Wt 95.3 kg (210 lb)   SpO2 97%   BMI 30.13 kg/m    BMI= Body mass index is 30.13 kg/m .    General: Generally appears well.  Psych: Alert and Oriented.  Normal affect  Neurological: non-focal, moves extremities symmetrically, grossly normal strength and sensation  Eyes: Sclera clear  ENT: mucous membranes moist, external ears and nose normal  Respiratory:  Lungs clear to ausculation bilaterally with good air excursion Breathing comfortably on room air  Cardiovascular:  Regular Rate and Rhythm with no murmurs gallops or rubs, normal peripheral pulses  GI: Abdomen Distended Hernia:  umbilical, mild tenderness with palpation  MSK: extremities without edema  Lymphatic/Hematologic/Immune:  No femoral lymphadenopathy.  Integumentary:  No rashes    Data reviewed    39 minutes spent on the date of the encounter doing chart review, history and exam, documentation and further activities as noted above      Jad Baum MD  12/17/23 11:34 PM   "

## 2024-01-02 ENCOUNTER — TELEPHONE (OUTPATIENT)
Dept: SURGERY | Facility: CLINIC | Age: 54
End: 2024-01-02
Payer: COMMERCIAL

## 2024-01-02 DIAGNOSIS — Z98.890 POSTOPERATIVE STATE: Primary | ICD-10-CM

## 2024-01-02 NOTE — TELEPHONE ENCOUNTER
[facility]   Postoperative Follow-up Phone Call  -Call to patient to review recent procedure and recovery    Attempted to call patient for post op check.  No answer.  Message was left for patient to call back if they had any questions or concerns.  Not signed up for [employer]; unable to send message.    Emilee Jackson PA-C

## 2024-03-10 ENCOUNTER — HEALTH MAINTENANCE LETTER (OUTPATIENT)
Age: 54
End: 2024-03-10

## 2025-03-16 ENCOUNTER — HEALTH MAINTENANCE LETTER (OUTPATIENT)
Age: 55
End: 2025-03-16

## (undated) DEVICE — SYR 20ML LL W/O NDL 302830

## (undated) DEVICE — SU VICRYL 4-0 PS-2 18" UND J496H

## (undated) DEVICE — LINEN ORTHO ACL PACK 5447

## (undated) DEVICE — SU STRATAFIX PDS PLUS 3-0 SPIRAL SH 15CM SXPP1B420

## (undated) DEVICE — DAVINCI HOT SHEARS TIP COVER  400180

## (undated) DEVICE — GOWN XLG DISP 9545

## (undated) DEVICE — ESU GROUND PAD ADULT W/CORD E7507

## (undated) DEVICE — BLADE KNIFE SURG 11 371111

## (undated) DEVICE — DRAPE MAYO STAND 23X54 8337

## (undated) DEVICE — DECANTER VIAL 2006S

## (undated) DEVICE — DAVINCI XI DRAPE ARM 470015

## (undated) DEVICE — GLOVE BIOGEL PI MICRO INDICATOR UNDERGLOVE SZ 7.5 48975

## (undated) DEVICE — SU DERMABOND ADVANCED .7ML DNX12

## (undated) DEVICE — ANTIFOG SOLUTION SEE SHARP 150M TROCAR SWABS 30978

## (undated) DEVICE — LUBRICANT INST ELECTROLUBE EL101

## (undated) DEVICE — TUBING INSUFFLATION W/FILTER 10FT GS1016

## (undated) DEVICE — DAVINCI XI SEAL UNIVERSAL 5-8MM 470361

## (undated) DEVICE — PREP CHLORAPREP 26ML TINTED HI-LITE ORANGE 930815

## (undated) DEVICE — NDL INSUFFLATION 13GA 120MM C2201

## (undated) DEVICE — SUTURE VICRYL+ 2-0 CT-2 27" UND VCP269H

## (undated) DEVICE — Device

## (undated) DEVICE — SOL WATER IRRIG 1000ML BOTTLE 2F7114

## (undated) DEVICE — DAVINCI XI OBTURATOR BLADELESS 8MM 470359

## (undated) DEVICE — LINEN DRAPE 54X72" 5467

## (undated) RX ORDER — EPHEDRINE SULFATE 50 MG/ML
INJECTION, SOLUTION INTRAMUSCULAR; INTRAVENOUS; SUBCUTANEOUS
Status: DISPENSED
Start: 2023-12-06

## (undated) RX ORDER — FENTANYL CITRATE 50 UG/ML
INJECTION, SOLUTION INTRAMUSCULAR; INTRAVENOUS
Status: DISPENSED
Start: 2023-12-06

## (undated) RX ORDER — ONDANSETRON 2 MG/ML
INJECTION INTRAMUSCULAR; INTRAVENOUS
Status: DISPENSED
Start: 2023-12-06

## (undated) RX ORDER — CEFAZOLIN SODIUM/WATER 2 G/20 ML
SYRINGE (ML) INTRAVENOUS
Status: DISPENSED
Start: 2023-12-06

## (undated) RX ORDER — LIDOCAINE HYDROCHLORIDE 10 MG/ML
INJECTION, SOLUTION EPIDURAL; INFILTRATION; INTRACAUDAL; PERINEURAL
Status: DISPENSED
Start: 2023-12-06

## (undated) RX ORDER — DEXAMETHASONE SODIUM PHOSPHATE 4 MG/ML
INJECTION, SOLUTION INTRA-ARTICULAR; INTRALESIONAL; INTRAMUSCULAR; INTRAVENOUS; SOFT TISSUE
Status: DISPENSED
Start: 2023-12-06

## (undated) RX ORDER — OXYCODONE HYDROCHLORIDE 5 MG/1
TABLET ORAL
Status: DISPENSED
Start: 2023-12-06

## (undated) RX ORDER — FENTANYL CITRATE-0.9 % NACL/PF 10 MCG/ML
PLASTIC BAG, INJECTION (ML) INTRAVENOUS
Status: DISPENSED
Start: 2023-12-06

## (undated) RX ORDER — HYDROMORPHONE HCL IN WATER/PF 6 MG/30 ML
PATIENT CONTROLLED ANALGESIA SYRINGE INTRAVENOUS
Status: DISPENSED
Start: 2023-12-06

## (undated) RX ORDER — BUPIVACAINE HYDROCHLORIDE 5 MG/ML
INJECTION, SOLUTION EPIDURAL; INTRACAUDAL
Status: DISPENSED
Start: 2023-12-06

## (undated) RX ORDER — PROPOFOL 10 MG/ML
INJECTION, EMULSION INTRAVENOUS
Status: DISPENSED
Start: 2023-12-06